# Patient Record
Sex: FEMALE | Race: WHITE | NOT HISPANIC OR LATINO | Employment: FULL TIME | URBAN - METROPOLITAN AREA
[De-identification: names, ages, dates, MRNs, and addresses within clinical notes are randomized per-mention and may not be internally consistent; named-entity substitution may affect disease eponyms.]

---

## 2017-06-02 ENCOUNTER — OFFICE VISIT (OUTPATIENT)
Dept: OBSTETRICS AND GYNECOLOGY | Facility: CLINIC | Age: 37
End: 2017-06-02

## 2017-06-02 VITALS
HEIGHT: 69 IN | DIASTOLIC BLOOD PRESSURE: 80 MMHG | WEIGHT: 200 LBS | BODY MASS INDEX: 29.62 KG/M2 | SYSTOLIC BLOOD PRESSURE: 120 MMHG

## 2017-06-02 DIAGNOSIS — Z11.3 SCREEN FOR STD (SEXUALLY TRANSMITTED DISEASE): ICD-10-CM

## 2017-06-02 DIAGNOSIS — Z12.4 PAP SMEAR FOR CERVICAL CANCER SCREENING: ICD-10-CM

## 2017-06-02 DIAGNOSIS — Z13.9 SCREENING: ICD-10-CM

## 2017-06-02 DIAGNOSIS — Z01.419 WELL FEMALE EXAM WITH ROUTINE GYNECOLOGICAL EXAM: Primary | ICD-10-CM

## 2017-06-02 DIAGNOSIS — N93.9 ABNORMAL UTERINE BLEEDING (AUB): ICD-10-CM

## 2017-06-02 LAB
BILIRUB BLD-MCNC: NEGATIVE MG/DL
CLARITY, POC: CLEAR
COLOR UR: YELLOW
GLUCOSE UR STRIP-MCNC: NEGATIVE MG/DL
KETONES UR QL: NEGATIVE
LEUKOCYTE EST, POC: NEGATIVE
NITRITE UR-MCNC: NEGATIVE MG/ML
PH UR: 6 [PH] (ref 5–8)
PROT UR STRIP-MCNC: NEGATIVE MG/DL
RBC # UR STRIP: ABNORMAL /UL
SP GR UR: 1.01 (ref 1–1.03)
UROBILINOGEN UR QL: NORMAL

## 2017-06-02 PROCEDURE — 99395 PREV VISIT EST AGE 18-39: CPT | Performed by: OBSTETRICS & GYNECOLOGY

## 2017-06-02 PROCEDURE — 81002 URINALYSIS NONAUTO W/O SCOPE: CPT | Performed by: OBSTETRICS & GYNECOLOGY

## 2017-06-02 NOTE — PROGRESS NOTES
[facility]  Routine Annual Visit    2017    Patient: Suman Goodman          MR#:[id number]      History of Present Illness    37 y.o. female  who presents for annual exam.    Patient's last menstrual period was 2017 (lmp unknown).  Obstetric History:  OB History      Para Term  AB TAB SAB Ectopic Multiple Living    4 3 3  1  1   3         Menstrual History:  Menarche age: 12 years  Patient's last menstrual period was 2017 (lmp unknown).  Period Cycle (Days): 30  Period Duration (Days): 5  Period Pattern: Regular  Menstrual Flow: Heavy  Menstrual Control: Maxi pad, Tampon    Sexual History:       ________________________________________  Patient Active Problem List   Diagnosis   • Well female exam with routine gynecological exam   • Abnormal uterine bleeding (AUB)       History reviewed. No pertinent past medical history.    Past Surgical History:   Procedure Laterality Date   • CHOLECYSTECTOMY     • TONSILLECTOMY     • TUBAL ABDOMINAL LIGATION     • WOUND EXPLORATION ARM      left arm abcess       History   Smoking Status   • Former Smoker   • Quit date: 2015   Smokeless Tobacco   • Never Used       Family History   Problem Relation Age of Onset   • Cervical cancer Maternal Grandmother 60       Prior to Admission medications    Not on File     ________________________________________    Current contraception: tubal ligation  History of abnormal Pap smear: no  Family history of uterine or ovarian cancer: no  Family History of colon cancer/colon polyps: no  History of abnormal mammogram: no  History of abnormal lipids: no    The following portions of the patient's history were reviewed and updated as appropriate: allergies, current medications, past family history, past medical history, past social history, past surgical history and problem list.    Review of Systems    Pertinent items are noted in HPI.     Objective   Physical Exam    /80   "Ht 69\" (175.3 cm)  Wt 200 lb (90.7 kg)  LMP 05/23/2017 (LMP Unknown)  Breastfeeding? No  BMI 29.53 kg/m2   BP Readings from Last 3 Encounters:   06/02/17 120/80      Wt Readings from Last 3 Encounters:   06/02/17 200 lb (90.7 kg)        BMI: Estimated body mass index is 29.53 kg/(m^2) as calculated from the following:    Height as of this encounter: 69\" (175.3 cm).    Weight as of this encounter: 200 lb (90.7 kg).    General:   alert, appears stated age and cooperative   Heart: regular rate and rhythm, S1, S2 normal, no murmur, click, rub or gallop   Lungs: clear to auscultation bilaterally   Abdomen: soft, non-tender, without masses or organomegaly   Breast: inspection negative, no nipple discharge or bleeding, no masses or nodularity palpable and mild bilateral FC change    Vulva: normal   Vagina: normal mucosa   Cervix: no lesions   Uterus: normal size   Adnexa: exam limited by habitus     Assessment:    Suman was seen today for gynecologic exam.    Diagnoses and all orders for this visit:    Well female exam with routine gynecological exam    Screening  -     POC Urinalysis Dipstick    Pap smear for cervical cancer screening  -     IgP, Aptima HPV    Abnormal uterine bleeding (AUB)  -     TSH  -     Luteinizing hormone  -     Follicle stimulating hormone  -     Prolactin  -     T4, free          Plan:    []  Rx:   []  Mammogram request made  []  PAP done  []  Occult fecal blood test (Insure)  []  Labs:   []  GC/Chl/TV  []  DEXA scan   []  Referral for colonoscopy:           Chris Macedo MD  6/2/2017 1:58 PM  "

## 2017-06-03 LAB
FSH SERPL-ACNC: 10.8 MIU/ML
LH SERPL-ACNC: 29.7 MIU/ML
PROLACTIN SERPL-MCNC: 15.8 NG/ML (ref 4.8–23.3)
T4 FREE SERPL-MCNC: 1.13 NG/DL (ref 0.82–1.77)
TSH SERPL DL<=0.005 MIU/L-ACNC: 2.47 UIU/ML (ref 0.45–4.5)

## 2017-06-05 ENCOUNTER — TELEPHONE (OUTPATIENT)
Dept: OBSTETRICS AND GYNECOLOGY | Facility: CLINIC | Age: 37
End: 2017-06-05

## 2017-06-05 NOTE — TELEPHONE ENCOUNTER
----- Message from Chris Macedo MD sent at 6/3/2017  7:37 AM EDT -----  Notify pt:  Hormonal lab panel is normal and unremarkable

## 2017-06-06 LAB
C TRACH RRNA SPEC QL NAA+PROBE: NEGATIVE
N GONORRHOEA RRNA SPEC QL NAA+PROBE: NEGATIVE
T VAGINALIS RRNA SPEC QL NAA+PROBE: NEGATIVE

## 2017-06-07 ENCOUNTER — TELEPHONE (OUTPATIENT)
Dept: OBSTETRICS AND GYNECOLOGY | Facility: CLINIC | Age: 37
End: 2017-06-07

## 2017-06-07 LAB
CYTOLOGIST CVX/VAG CYTO: NORMAL
CYTOLOGY CVX/VAG DOC THIN PREP: NORMAL
DX ICD CODE: NORMAL
HIV 1 & 2 AB SER-IMP: NORMAL
HPV I/H RISK 4 DNA CVX QL PROBE+SIG AMP: NEGATIVE
OTHER STN SPEC: NORMAL
PATH REPORT.FINAL DX SPEC: NORMAL
STAT OF ADQ CVX/VAG CYTO-IMP: NORMAL

## 2017-06-07 NOTE — TELEPHONE ENCOUNTER
----- Message from Chris Macedo MD sent at 6/7/2017  8:13 AM EDT -----  Call pt:  PAP is negative.

## 2017-06-14 ENCOUNTER — OFFICE VISIT (OUTPATIENT)
Dept: OBSTETRICS AND GYNECOLOGY | Facility: CLINIC | Age: 37
End: 2017-06-14

## 2017-06-14 ENCOUNTER — PROCEDURE VISIT (OUTPATIENT)
Dept: OBSTETRICS AND GYNECOLOGY | Facility: CLINIC | Age: 37
End: 2017-06-14

## 2017-06-14 VITALS
BODY MASS INDEX: 29.47 KG/M2 | WEIGHT: 199 LBS | DIASTOLIC BLOOD PRESSURE: 70 MMHG | HEIGHT: 69 IN | SYSTOLIC BLOOD PRESSURE: 110 MMHG

## 2017-06-14 DIAGNOSIS — N93.9 ABNORMAL UTERINE BLEEDING (AUB): ICD-10-CM

## 2017-06-14 DIAGNOSIS — N97.0 ANOVULATORY BLEEDING: Primary | ICD-10-CM

## 2017-06-14 DIAGNOSIS — Z13.9 SCREENING: ICD-10-CM

## 2017-06-14 LAB
BILIRUB BLD-MCNC: NEGATIVE MG/DL
CLARITY, POC: CLEAR
COLOR UR: YELLOW
GLUCOSE UR STRIP-MCNC: NEGATIVE MG/DL
KETONES UR QL: NEGATIVE
LEUKOCYTE EST, POC: NEGATIVE
NITRITE UR-MCNC: NEGATIVE MG/ML
PH UR: 6 [PH] (ref 5–8)
PROT UR STRIP-MCNC: NEGATIVE MG/DL
RBC # UR STRIP: ABNORMAL /UL
SP GR UR: 1.02 (ref 1–1.03)
UROBILINOGEN UR QL: NORMAL

## 2017-06-14 PROCEDURE — 76830 TRANSVAGINAL US NON-OB: CPT | Performed by: OBSTETRICS & GYNECOLOGY

## 2017-06-14 PROCEDURE — 99213 OFFICE O/P EST LOW 20 MIN: CPT | Performed by: OBSTETRICS & GYNECOLOGY

## 2017-06-14 PROCEDURE — 81002 URINALYSIS NONAUTO W/O SCOPE: CPT | Performed by: OBSTETRICS & GYNECOLOGY

## 2017-06-14 RX ORDER — MEDROXYPROGESTERONE ACETATE 10 MG/1
10 TABLET ORAL DAILY
Qty: 30 TABLET | Refills: 4 | Status: SHIPPED | OUTPATIENT
Start: 2017-06-14 | End: 2017-06-24

## 2017-06-14 NOTE — PROGRESS NOTES
[facility]  Ultrasound Note     2017    Patient:  Suman Goodman      MR#:[id number]    37 y.o.      Patient Active Problem List   Diagnosis   • Well female exam with routine gynecological exam   • Abnormal uterine bleeding (AUB)   • Anovulatory bleeding       [See the scanned report in the media tab for more details]    Impression    1.  Normal uterus: 7.8 x 4.1 x 4.9  2.  Endometrium prominent at 15 mm   3.  Myometrium unremarkable  4.  Ovaries bilaterally within normal limits  5.  Essentially unremarkable pelvic ultrasound    Relevant comparison data available:  [x]  None      Chris Macedo MD  2017 12:41 PM

## 2017-06-14 NOTE — PROGRESS NOTES
[facility]     2017      Patient:  Suman Goodman   MR#:[id number]    Office note    CC:  Irregular cycles     Subjective     History of Present Illness  37 y.o. female  previously for annual exam reporting abnormal uterine bleeding with periods occurring every 18-20 days lasting for 4 - 6 days.  Anovulatory panel returned within normal limits.  Ultrasound is completed today that's essentially unremarkable with the exception of a slightly prominent endometrium.  I discussed with the patient she most likely has anovulatory bleeding      Patient Active Problem List   Diagnosis   • Well female exam with routine gynecological exam   • Abnormal uterine bleeding (AUB)       History reviewed. No pertinent past medical history.    Past Surgical History:   Procedure Laterality Date   • CHOLECYSTECTOMY     • TONSILLECTOMY     • TUBAL ABDOMINAL LIGATION     • WOUND EXPLORATION ARM  2005    left arm abcess       Obstetric History:  OB History      Para Term  AB TAB SAB Ectopic Multiple Living    4 3 3  1  1   3         Menstrual History:     Patient's last menstrual period was 2017 (lmp unknown).       #: 1, Date: , Sex: Female, Weight: 7 lb 14 oz (3.572 kg), GA: 40w0d, Delivery: Vaginal, Spontaneous Delivery, Apgar1: None, Apgar5: None, Living: Yes, Birth Comments: None    #: 2, Date: , Sex: None, Weight: None, GA: None, Delivery: None, Apgar1: None, Apgar5: None, Living: None, Birth Comments: None    #: 3, Date: 2006, Sex: Male, Weight: 8 lb 11 oz (3.941 kg), GA: 40w0d, Delivery: Vaginal, Spontaneous Delivery, Apgar1: 8, Apgar5: 9, Living: Yes, Birth Comments: None    #: 4, Date: 08, Sex: Male, Weight: 7 lb 9 oz (3.43 kg), GA: 39w0d, Delivery: Vaginal, Spontaneous Delivery, Apgar1: 8, Apgar5: 9, Living: Yes, Birth Comments: None      Family History   Problem Relation Age of Onset   • Cervical cancer Maternal Grandmother 60       Social History  "  Substance Use Topics   • Smoking status: Former Smoker     Quit date: 6/2/2015   • Smokeless tobacco: Never Used   • Alcohol use Yes      Comment: rare       Augmentin [amoxicillin-pot clavulanate]; Keflex [cephalexin]; and Penicillins    No current outpatient prescriptions on file.    The following portions of the patient's history were reviewed and updated as appropriate: allergies, current medications, past family history, past medical history, past social history, past surgical history and problem list.    Review of Systems   Constitutional: Negative.    HENT: Negative.    Eyes: Negative.    Respiratory: Negative.    Cardiovascular: Negative.    Gastrointestinal: Negative.    Endocrine: Negative.    Genitourinary: Positive for menstrual problem and vaginal bleeding.   Musculoskeletal: Negative.    Skin: Negative.    Allergic/Immunologic: Negative.    Neurological: Negative.    Hematological: Negative.    Psychiatric/Behavioral: Negative.        BP Readings from Last 3 Encounters:   06/14/17 110/70   06/02/17 120/80      Wt Readings from Last 3 Encounters:   06/14/17 199 lb (90.3 kg)   06/02/17 200 lb (90.7 kg)      BMI: Estimated body mass index is 29.39 kg/(m^2) as calculated from the following:    Height as of this encounter: 69\" (175.3 cm).    Weight as of this encounter: 199 lb (90.3 kg).  BSA: Estimated body surface area is 2.06 meters squared as calculated from the following:    Height as of this encounter: 69\" (175.3 cm).    Weight as of this encounter: 199 lb (90.3 kg).    Objective   Physical Exam   Constitutional: She appears well-developed and well-nourished.   Cardiovascular: Normal rate.    Pulmonary/Chest: Effort normal.   Abdominal: Soft. She exhibits no distension and no mass. There is no tenderness. No hernia.   Nursing note and vitals reviewed.        Assessment/Plan     1.  Anovulatory bleeding-Rx Provera 10 mg ×10 days start today every month      Chris Macedo MD   6/14/2017 12:12 PM  "

## 2020-09-16 ENCOUNTER — OFFICE VISIT (OUTPATIENT)
Dept: OBSTETRICS AND GYNECOLOGY | Age: 40
End: 2020-09-16

## 2020-09-16 VITALS — BODY MASS INDEX: 30.72 KG/M2 | SYSTOLIC BLOOD PRESSURE: 128 MMHG | WEIGHT: 208 LBS | DIASTOLIC BLOOD PRESSURE: 80 MMHG

## 2020-09-16 DIAGNOSIS — Z01.419 WELL FEMALE EXAM WITH ROUTINE GYNECOLOGICAL EXAM: Primary | ICD-10-CM

## 2020-09-16 DIAGNOSIS — F17.200 SMOKER UNMOTIVATED TO QUIT: ICD-10-CM

## 2020-09-16 DIAGNOSIS — N97.0 ANOVULATORY (DYSFUNCTIONAL UTERINE) BLEEDING: ICD-10-CM

## 2020-09-16 DIAGNOSIS — Z13.89 SCREENING FOR BLOOD OR PROTEIN IN URINE: ICD-10-CM

## 2020-09-16 DIAGNOSIS — Z12.4 SCREENING FOR MALIGNANT NEOPLASM OF THE CERVIX: ICD-10-CM

## 2020-09-16 LAB
BILIRUB BLD-MCNC: NEGATIVE MG/DL
GLUCOSE UR STRIP-MCNC: NEGATIVE MG/DL
KETONES UR QL: NEGATIVE
LEUKOCYTE EST, POC: NEGATIVE
NITRITE UR-MCNC: NEGATIVE MG/ML
PH UR: 6 [PH] (ref 5–8)
PROT UR STRIP-MCNC: NEGATIVE MG/DL
RBC # UR STRIP: ABNORMAL /UL
SP GR UR: 1.02 (ref 1–1.03)
UROBILINOGEN UR QL: NORMAL

## 2020-09-16 PROCEDURE — 81002 URINALYSIS NONAUTO W/O SCOPE: CPT | Performed by: OBSTETRICS & GYNECOLOGY

## 2020-09-16 PROCEDURE — 99213 OFFICE O/P EST LOW 20 MIN: CPT | Performed by: OBSTETRICS & GYNECOLOGY

## 2020-09-16 PROCEDURE — 99386 PREV VISIT NEW AGE 40-64: CPT | Performed by: OBSTETRICS & GYNECOLOGY

## 2020-09-16 RX ORDER — MEDROXYPROGESTERONE ACETATE 10 MG/1
10 TABLET ORAL DAILY
Qty: 30 TABLET | Refills: 4 | Status: SHIPPED | OUTPATIENT
Start: 2020-09-16 | End: 2020-09-26

## 2020-09-16 NOTE — PROGRESS NOTES
Routine Annual Visit    2020    Patient: Suman Goodman          MR#:[id number]      History of Present Illness    Chief Complaint   Patient presents with   • Gynecologic Exam     cc: reestablish care today , annual visit today , last pap 17 neg , [facility]g [facility] , c/o having heavy periods in the past but the past 2 months had no menstrual cycle  .         40 y.o. female  who presents for annual exam.    The patient presents for routine annual exam without major complaints todau.  She does state having periodic irregular menstrual cycles consistent with anovulatory bleeding that she describes as intermittent and moderate for the last year.        Patient's last menstrual period was 07/15/2020 (approximate).  Obstetric History:  OB History        4    Para   3    Term   3            AB   1    Living   3       SAB   1    TAB        Ectopic        Molar        Multiple        Live Births   3               Menstrual History:     Patient's last menstrual period was 07/15/2020 (approximate).       Sexual History:       ________________________________________  Patient Active Problem List   Diagnosis   • Well female exam with routine gynecological exam   • Abnormal uterine bleeding (AUB)   • Anovulatory bleeding   • Smoker unmotivated to quit       History reviewed. No pertinent past medical history.    Past Surgical History:   Procedure Laterality Date   • CHOLECYSTECTOMY     • TONSILLECTOMY     • TUBAL ABDOMINAL LIGATION     • WOUND EXPLORATION ARM  2005    left arm abcess       Social History     Tobacco Use   Smoking Status Current Every Day Smoker   Smokeless Tobacco Never Used       Family History   Problem Relation Age of Onset   • Cervical cancer Maternal Grandmother 60   • No Known Problems Mother    • No Known Problems Father        Prior to Admission medications    Medication Sig Start Date End Date Taking? Authorizing Provider   valACYclovir (VALTREX) 1000  "MG tablet Take 1 tablet by mouth 3 (Three) Times a Day. 6/5/18   Dianelys Bates, KIKA     ________________________________________    Current contraception: tubal ligation  History of abnormal Pap smear: no  Family history of uterine or ovarian cancer: no  Family History of colon cancer/colon polyps: no  History of abnormal mammogram: no  History of abnormal lipids: no    The following portions of the patient's history were reviewed and updated as appropriate: allergies, current medications, past family history, past medical history, past social history, past surgical history and problem list.    Review of Systems    Pertinent items are noted in HPI.     Objective   Physical Exam    /80   Wt 94.3 kg (208 lb)   LMP 07/15/2020 (Approximate)   Breastfeeding No   BMI 30.72 kg/m²    BP Readings from Last 3 Encounters:   09/16/20 128/80   06/05/18 138/86   06/14/17 110/70      Wt Readings from Last 3 Encounters:   09/16/20 94.3 kg (208 lb)   06/05/18 97.1 kg (214 lb)   06/14/17 90.3 kg (199 lb)        BMI: Estimated body mass index is 30.72 kg/m² as calculated from the following:    Height as of 6/5/18: 175.3 cm (69\").    Weight as of this encounter: 94.3 kg (208 lb).       General: alert, appears stated age and cooperative   Heart: regular rate and rhythm, S1, S2 normal, no murmur, click, rub or gallop   Lungs: clear to auscultation bilaterally   Abdomen: soft, non-tender, without masses, no organomegaly   Breast: inspection negative, no nipple discharge or bleeding, no masses or nodularity palpable   Vulva: normal and bartholin's, Urethra, Abeytas's normal   Vagina: normal mucosa, normal discharge   Cervix: multiparous appearance and no lesions   Uterus: normal size, mobile or non-tender   Adnexa: normal adnexa     As part of wellness and prevention, the following topics were discussed with the patient:  Encouraged self breast exam  Physical activity and regular exercised encouraged.         Assessment:    Suman " was seen today for gynecologic exam.    Diagnoses and all orders for this visit:    Well female exam with routine gynecological exam    Screening for malignant neoplasm of the cervix  -     IgP, Aptima HPV    Screening for blood or protein in urine  -     POC Urinalysis Dipstick    Anovulatory (dysfunctional uterine) bleeding  -     medroxyPROGESTERone (Provera) 10 MG tablet; Take 1 tablet by mouth Daily for 10 days.    Smoker unmotivated to quit      Discussed long-term risk of anovulatory bleeding and developing endometrial hyperplasia and endometrial cancer.  Provera is sent to the patient's pharmacy to trigger her menstrual cycle.  Thereafter if her menstrual cycles are relatively regular no intervention is indicated.  If the patient begins to have irregular bleeding and prolonged periods with no menses she needs to call      Plan:  Return in about 1 year (around 9/16/2021) for Annual GYN exam.      Chris Macedo MD  9/16/2020 11:37 EDT

## 2020-09-17 DIAGNOSIS — N60.02 BREAST CYST, LEFT: Primary | ICD-10-CM

## 2020-09-17 PROBLEM — R92.8 ABNORMALITY OF LEFT BREAST ON SCREENING MAMMOGRAM: Status: ACTIVE | Noted: 2020-09-17

## 2020-09-18 LAB
C TRACH RRNA SPEC QL NAA+PROBE: NEGATIVE
N GONORRHOEA RRNA SPEC QL NAA+PROBE: NEGATIVE
T VAGINALIS DNA SPEC QL NAA+PROBE: NEGATIVE

## 2020-09-21 LAB
CYTOLOGIST CVX/VAG CYTO: NORMAL
CYTOLOGY CVX/VAG DOC CYTO: NORMAL
CYTOLOGY CVX/VAG DOC THIN PREP: NORMAL
DX ICD CODE: NORMAL
HIV 1 & 2 AB SER-IMP: NORMAL
HPV I/H RISK 4 DNA CVX QL PROBE+SIG AMP: NEGATIVE
OTHER STN SPEC: NORMAL
STAT OF ADQ CVX/VAG CYTO-IMP: NORMAL

## 2021-03-09 ENCOUNTER — TELEPHONE (OUTPATIENT)
Dept: OBSTETRICS AND GYNECOLOGY | Age: 41
End: 2021-03-09

## 2021-03-09 DIAGNOSIS — R92.8 ABNORMALITY OF LEFT BREAST ON SCREENING MAMMOGRAM: Primary | ICD-10-CM

## 2021-03-09 NOTE — TELEPHONE ENCOUNTER
Patient was due for a 6 month f/u in January for Left Breast U/S. Had patient scheduled 1/7/21 for Left Breast U/S at [facility]. She no showed. I have been calling pt since January and sent a post card. I am unable to reach patient to get her rescheduled at [facility] for this study.  Fany

## 2021-03-09 NOTE — TELEPHONE ENCOUNTER
Patient just returned my call. She states she did go in January [facility] did not send us the report. Per Pt she states radiologist came in and told her she will need a 6 month f/u in June. I will call [facility] for this report and route to Dr. Macedo for orders.  Fany

## 2021-10-06 ENCOUNTER — OFFICE VISIT (OUTPATIENT)
Dept: OBSTETRICS AND GYNECOLOGY | Age: 41
End: 2021-10-06

## 2021-10-06 VITALS
DIASTOLIC BLOOD PRESSURE: 80 MMHG | WEIGHT: 214.8 LBS | SYSTOLIC BLOOD PRESSURE: 124 MMHG | BODY MASS INDEX: 31.81 KG/M2 | HEIGHT: 69 IN

## 2021-10-06 DIAGNOSIS — Z13.0 SCREENING, ANEMIA, DEFICIENCY, IRON: ICD-10-CM

## 2021-10-06 DIAGNOSIS — R92.8 ABNORMALITY OF LEFT BREAST ON SCREENING MAMMOGRAM: ICD-10-CM

## 2021-10-06 DIAGNOSIS — Z12.31 SCREENING MAMMOGRAM, ENCOUNTER FOR: ICD-10-CM

## 2021-10-06 DIAGNOSIS — Z12.4 SCREENING FOR MALIGNANT NEOPLASM OF CERVIX: ICD-10-CM

## 2021-10-06 DIAGNOSIS — F17.200 SMOKER UNMOTIVATED TO QUIT: ICD-10-CM

## 2021-10-06 DIAGNOSIS — N60.02 BENIGN CYST OF LEFT BREAST: ICD-10-CM

## 2021-10-06 DIAGNOSIS — Z13.89 SCREENING FOR BLOOD OR PROTEIN IN URINE: ICD-10-CM

## 2021-10-06 DIAGNOSIS — R63.5 WEIGHT GAIN: ICD-10-CM

## 2021-10-06 DIAGNOSIS — Z01.419 WELL FEMALE EXAM WITH ROUTINE GYNECOLOGICAL EXAM: Primary | ICD-10-CM

## 2021-10-06 DIAGNOSIS — Z13.228 SCREENING FOR METABOLIC DISORDER: ICD-10-CM

## 2021-10-06 DIAGNOSIS — Z11.51 SCREENING FOR HUMAN PAPILLOMAVIRUS (HPV): ICD-10-CM

## 2021-10-06 DIAGNOSIS — N93.9 ABNORMAL UTERINE BLEEDING (AUB): ICD-10-CM

## 2021-10-06 LAB
BILIRUB BLD-MCNC: ABNORMAL MG/DL
GLUCOSE UR STRIP-MCNC: NEGATIVE MG/DL
KETONES UR QL: NEGATIVE
LEUKOCYTE EST, POC: NEGATIVE
NITRITE UR-MCNC: NEGATIVE MG/ML
PH UR: 6 [PH] (ref 5–8)
PROT UR STRIP-MCNC: ABNORMAL MG/DL
RBC # UR STRIP: ABNORMAL /UL
SP GR UR: 1.03 (ref 1–1.03)
UROBILINOGEN UR QL: NORMAL

## 2021-10-06 PROCEDURE — 81002 URINALYSIS NONAUTO W/O SCOPE: CPT | Performed by: OBSTETRICS & GYNECOLOGY

## 2021-10-06 PROCEDURE — 99396 PREV VISIT EST AGE 40-64: CPT | Performed by: OBSTETRICS & GYNECOLOGY

## 2021-10-06 RX ORDER — HYDROXYZINE HYDROCHLORIDE 25 MG/1
25 TABLET, FILM COATED ORAL
COMMUNITY
Start: 2021-05-26 | End: 2021-10-06

## 2021-10-06 RX ORDER — ROSUVASTATIN CALCIUM 10 MG/1
10 TABLET, COATED ORAL DAILY
COMMUNITY
Start: 2021-05-27 | End: 2021-11-23

## 2021-10-06 NOTE — PROGRESS NOTES
[facility]   Obstetrics and Gynecology   Routine Annual Visit    10/6/2021    Patient: Suman Goodman          MR#:[id number]    History of Present Illness    Chief Complaint   Patient presents with   • Gynecologic Exam     pap  (neg) Lt breast US - follow up in 6 months        41 y.o. female  who presents for annual exam.    The patient presents for her routine exam with complaint of continued intermittent irregularities in her cycle.  Most concerning of the patient's complaints is more recent weight gain that seems unexplained.  The patient feels fairly confident that she is menopausal despite still having regular menstrual bleeding.  The patient requests testing accordingly.    The patient also has a history of benign breast cysts with scheduled follow-up for routine surveillance      Studies reviewed:  US Breast Left Complete (2021)      Patient's last menstrual period was 2021 (exact date).  Obstetric History:  OB History        4    Para   3    Term   3            AB   1    Living   3       SAB   1    TAB        Ectopic        Molar        Multiple        Live Births   3               Menstrual History:     Patient's last menstrual period was 2021 (exact date).       Sexual History:       ________________________________________  Patient Active Problem List   Diagnosis   • Well female exam with routine gynecological exam   • Abnormal uterine bleeding (AUB)   • Anovulatory bleeding   • Smoker unmotivated to quit   • Benign cyst of left breast   • Abnormality of left breast on screening mammogram   • Weight gain     History reviewed. No pertinent past medical history.  Past Surgical History:   Procedure Laterality Date   • CHOLECYSTECTOMY     • TONSILLECTOMY     • TUBAL ABDOMINAL LIGATION     • WOUND EXPLORATION ARM  2005    left arm abcess     Social History     Tobacco Use   Smoking Status Current Every Day Smoker   Smokeless Tobacco  "Never Used     Family History   Problem Relation Age of Onset   • Cervical cancer Maternal Grandmother 60   • No Known Problems Mother    • No Known Problems Father      Prior to Admission medications    Medication Sig Start Date End Date Taking? Authorizing Provider   rosuvastatin (CRESTOR) 10 MG tablet Take 10 mg by mouth Daily. 5/27/21 11/23/21 Yes Smitha Landaverde MD   hydrOXYzine (ATARAX) 25 MG tablet Take 25 mg by mouth. 5/26/21   Smitha Landaverde MD   valACYclovir (VALTREX) 1000 MG tablet Take 1 tablet by mouth 3 (Three) Times a Day. 6/5/18   Dianelys Bates APRN   varenicline (Chantix Starting Month Pak) 0.5 MG X 11 & 1 MG X 42 tablet FOLLOW PACKAGE DIRECTIONS 9/14/21   Smitha Landaverde MD     ________________________________________    Current contraception: tubal ligation  History of abnormal Pap smear: no  Family history of uterine or ovarian cancer: no  Family History of colon cancer/colon polyps: no  History of abnormal mammogram: yes - benign breast cyst  History of abnormal lipids: yes - treated    The following portions of the patient's history were reviewed and updated as appropriate: allergies, current medications, past family history, past medical history, past social history, past surgical history and problem list.    Review of Systems    Pertinent items are noted in HPI.       Objective   Physical Exam    /80   Ht 175.3 cm (69\")   Wt 97.4 kg (214 lb 12.8 oz)   LMP 09/27/2021 (Exact Date)   Breastfeeding No   BMI 31.72 kg/m²    BP Readings from Last 3 Encounters:   10/06/21 124/80   09/16/20 128/80   06/05/18 138/86      Wt Readings from Last 3 Encounters:   10/06/21 97.4 kg (214 lb 12.8 oz)   09/16/20 94.3 kg (208 lb)   06/05/18 97.1 kg (214 lb)        BMI: Estimated body mass index is 31.72 kg/m² as calculated from the following:    Height as of this encounter: 175.3 cm (69\").    Weight as of this encounter: 97.4 kg (214 lb 12.8 oz).       General: alert, appears " stated age and cooperative   Heart: regular rate and rhythm, S1, S2 normal, no murmur, click, rub or gallop   Lungs: clear to auscultation bilaterally   Abdomen: soft, non-tender, without masses, no organomegaly   Breast: inspection negative, no nipple discharge or bleeding, no masses or nodularity palpable   External genitalia/Vulva: External genitalia including bartholin's glands, Urethra, Newbern's gland and urethra meatus are normal, Perineum, rectum and anus appear normal  and Bladder appears normal without significant prolapse    Vagina: normal mucosa, normal discharge   Cervix: no lesions   Uterus: normal size, mobile, non-tender and exam is limited habitus    Adnexa: normal adnexa     As part of wellness and prevention, the following topics were discussed with the patient:  Encouraged self breast exam  Physical activity and regular exercised encouraged.         Assessment:    Diagnoses and all orders for this visit:    1. Well female exam with routine gynecological exam (Primary)  -     IgP, Aptima HPV    2. Screening for blood or protein in urine  -     POC Urinalysis Dipstick    3. Abnormal uterine bleeding (AUB)    4. Abnormality of left breast on screening mammogram    5. Benign cyst of left breast    6. Smoker unmotivated to quit    7. Weight gain  -     Follicle Stimulating Hormone  -     Hemoglobin A1c  -     TSH  -     T4, free    8. Screening for metabolic disorder  -     Comprehensive Metabolic Panel    9. Screening, anemia, deficiency, iron  -     CBC & Differential    10. Screening for human papillomavirus (HPV)  -     IgP, Aptima HPV    11. Screening for malignant neoplasm of cervix  -     IgP, Aptima HPV    12. Screening mammogram, encounter for  -     Mammo Screening Digital Tomosynthesis Bilateral With CAD; Future        Plan:  Return in about 1 year (around 10/6/2022) for Annual GYN exam.      Chris Macedo MD  10/6/2021 10:06 EDT

## 2021-10-07 PROBLEM — N95.1 MENOPAUSAL STATE: Status: ACTIVE | Noted: 2021-10-07

## 2021-10-07 LAB
ALBUMIN SERPL-MCNC: 4.4 G/DL (ref 3.5–5.2)
ALBUMIN/GLOB SERPL: 2.3 G/DL
ALP SERPL-CCNC: 68 U/L (ref 39–117)
ALT SERPL-CCNC: 11 U/L (ref 1–33)
AST SERPL-CCNC: 12 U/L (ref 1–32)
BASOPHILS # BLD AUTO: 0.1 10*3/MM3 (ref 0–0.2)
BASOPHILS NFR BLD AUTO: 1.4 % (ref 0–1.5)
BILIRUB SERPL-MCNC: 0.2 MG/DL (ref 0–1.2)
BUN SERPL-MCNC: 9 MG/DL (ref 6–20)
BUN/CREAT SERPL: 10.3 (ref 7–25)
CALCIUM SERPL-MCNC: 9.7 MG/DL (ref 8.6–10.5)
CHLORIDE SERPL-SCNC: 105 MMOL/L (ref 98–107)
CO2 SERPL-SCNC: 24.3 MMOL/L (ref 22–29)
CREAT SERPL-MCNC: 0.87 MG/DL (ref 0.57–1)
EOSINOPHIL # BLD AUTO: 0.2 10*3/MM3 (ref 0–0.4)
EOSINOPHIL NFR BLD AUTO: 2.8 % (ref 0.3–6.2)
ERYTHROCYTE [DISTWIDTH] IN BLOOD BY AUTOMATED COUNT: 12.8 % (ref 12.3–15.4)
FSH SERPL-ACNC: 52.1 MIU/ML
GLOBULIN SER CALC-MCNC: 1.9 GM/DL
GLUCOSE SERPL-MCNC: 84 MG/DL (ref 65–99)
HBA1C MFR BLD: 5.2 % (ref 4.8–5.6)
HCT VFR BLD AUTO: 43.4 % (ref 34–46.6)
HGB BLD-MCNC: 14.2 G/DL (ref 12–15.9)
IMM GRANULOCYTES # BLD AUTO: 0.02 10*3/MM3 (ref 0–0.05)
IMM GRANULOCYTES NFR BLD AUTO: 0.3 % (ref 0–0.5)
LYMPHOCYTES # BLD AUTO: 1.95 10*3/MM3 (ref 0.7–3.1)
LYMPHOCYTES NFR BLD AUTO: 27.2 % (ref 19.6–45.3)
MCH RBC QN AUTO: 28.3 PG (ref 26.6–33)
MCHC RBC AUTO-ENTMCNC: 32.7 G/DL (ref 31.5–35.7)
MCV RBC AUTO: 86.6 FL (ref 79–97)
MONOCYTES # BLD AUTO: 0.39 10*3/MM3 (ref 0.1–0.9)
MONOCYTES NFR BLD AUTO: 5.4 % (ref 5–12)
NEUTROPHILS # BLD AUTO: 4.52 10*3/MM3 (ref 1.7–7)
NEUTROPHILS NFR BLD AUTO: 62.9 % (ref 42.7–76)
NRBC BLD AUTO-RTO: 0 /100 WBC (ref 0–0.2)
PLATELET # BLD AUTO: 255 10*3/MM3 (ref 140–450)
POTASSIUM SERPL-SCNC: 4.3 MMOL/L (ref 3.5–5.2)
PROT SERPL-MCNC: 6.3 G/DL (ref 6–8.5)
RBC # BLD AUTO: 5.01 10*6/MM3 (ref 3.77–5.28)
SODIUM SERPL-SCNC: 140 MMOL/L (ref 136–145)
T4 FREE SERPL-MCNC: 1.06 NG/DL (ref 0.93–1.7)
TSH SERPL DL<=0.005 MIU/L-ACNC: 3.63 UIU/ML (ref 0.27–4.2)
WBC # BLD AUTO: 7.18 10*3/MM3 (ref 3.4–10.8)

## 2021-10-11 LAB
CYTOLOGIST CVX/VAG CYTO: NORMAL
CYTOLOGY CVX/VAG DOC CYTO: NORMAL
CYTOLOGY CVX/VAG DOC THIN PREP: NORMAL
DX ICD CODE: NORMAL
HIV 1 & 2 AB SER-IMP: NORMAL
HPV I/H RISK 4 DNA CVX QL PROBE+SIG AMP: NEGATIVE
Lab: NORMAL
OTHER STN SPEC: NORMAL
STAT OF ADQ CVX/VAG CYTO-IMP: NORMAL

## 2021-10-12 DIAGNOSIS — R92.8 ABNORMALITY OF LEFT BREAST ON SCREENING MAMMOGRAM: Primary | ICD-10-CM

## 2021-11-03 ENCOUNTER — OFFICE VISIT (OUTPATIENT)
Dept: OBSTETRICS AND GYNECOLOGY | Age: 41
End: 2021-11-03

## 2021-11-03 VITALS
DIASTOLIC BLOOD PRESSURE: 78 MMHG | HEIGHT: 69 IN | BODY MASS INDEX: 31.7 KG/M2 | WEIGHT: 214 LBS | SYSTOLIC BLOOD PRESSURE: 120 MMHG

## 2021-11-03 DIAGNOSIS — N95.1 MENOPAUSAL SYMPTOMS: Primary | ICD-10-CM

## 2021-11-03 DIAGNOSIS — Z13.89 SCREENING FOR BLOOD OR PROTEIN IN URINE: ICD-10-CM

## 2021-11-03 LAB
BILIRUB BLD-MCNC: NEGATIVE MG/DL
CLARITY, POC: CLEAR
COLOR UR: YELLOW
GLUCOSE UR STRIP-MCNC: NEGATIVE MG/DL
KETONES UR QL: ABNORMAL
LEUKOCYTE EST, POC: NEGATIVE
NITRITE UR-MCNC: NEGATIVE MG/ML
PH UR: 6 [PH] (ref 5–8)
PROT UR STRIP-MCNC: NEGATIVE MG/DL
RBC # UR STRIP: ABNORMAL /UL
SP GR UR: 1.03 (ref 1–1.03)
UROBILINOGEN UR QL: NORMAL

## 2021-11-03 PROCEDURE — 81002 URINALYSIS NONAUTO W/O SCOPE: CPT | Performed by: OBSTETRICS & GYNECOLOGY

## 2021-11-03 PROCEDURE — 99213 OFFICE O/P EST LOW 20 MIN: CPT | Performed by: OBSTETRICS & GYNECOLOGY

## 2021-11-03 RX ORDER — NORETHINDRONE ACETATE AND ETHINYL ESTRADIOL 1; 5 MG/1; UG/1
1 TABLET ORAL NIGHTLY
Qty: 30 TABLET | Refills: 12 | Status: SHIPPED | OUTPATIENT
Start: 2021-11-03

## 2021-11-03 NOTE — PROGRESS NOTES
"[facility]   Obstetrics and Gynecology     11/3/2021      Patient:  Suman Goodman   MR#:[id number]    Office note    Chief Complaint   Patient presents with   • Consult     HRT consult; pt c/o hot flashes, insomnia, being hungry, weight fluctuation        Subjective     History of Present Illness  41 y.o. female  presents for follow-up on previous testing for menopausal symptoms that shows met menopause.  The patient is having significant sleep disturbances and reports that she \"just cannot think straight.\"    The patient's had labile mood with associated exceptional hunger and weight gain.        Relevant data reviewed:  IgP, Aptima HPV (10/06/2021 10:34)  T4, free (10/06/2021 10:09)  TSH (10/06/2021 10:09)  Hemoglobin A1c (10/06/2021 10:09)  Follicle Stimulating Hormone (10/06/2021 10:09)  CBC & Differential (10/06/2021 10:09)  Comprehensive Metabolic Panel (10/06/2021 10:09)      Patient Active Problem List   Diagnosis   • Well female exam with routine gynecological exam   • Abnormal uterine bleeding (AUB)   • Anovulatory bleeding   • Smoker unmotivated to quit   • Benign cyst of left breast   • Abnormality of left breast on screening mammogram   • Weight gain   • Menopausal state       No past medical history on file.  Past Surgical History:   Procedure Laterality Date   • CHOLECYSTECTOMY     • TONSILLECTOMY     • TUBAL ABDOMINAL LIGATION     • WOUND EXPLORATION ARM  2005    left arm abcess     Obstetric History:  OB History        4    Para   3    Term   3            AB   1    Living   3       SAB   1    IAB        Ectopic        Molar        Multiple        Live Births   3               Menstrual History:     Patient's last menstrual period was 10/24/2021 (exact date).       # 1 - Date: , Sex: Female, Weight: 3572 g (7 lb 14 oz), GA: 40w0d, Delivery: Vaginal, Spontaneous, Apgar1: None, Apgar5: None, Living: Living, Birth Comments: None    # 2 - Date: , " Sex: None, Weight: None, GA: None, Delivery: None, Apgar1: None, Apgar5: None, Living: None, Birth Comments: None    # 3 - Date: 11/2006, Sex: Male, Weight: 3941 g (8 lb 11 oz), GA: 40w0d, Delivery: Vaginal, Spontaneous, Apgar1: 8, Apgar5: 9, Living: Living, Birth Comments: None    # 4 - Date: 02/29/08, Sex: Male, Weight: 3430 g (7 lb 9 oz), GA: 39w0d, Delivery: Vaginal, Spontaneous, Apgar1: 8, Apgar5: 9, Living: Living, Birth Comments: None    Family History   Problem Relation Age of Onset   • Cervical cancer Maternal Grandmother 60   • No Known Problems Mother    • No Known Problems Father      Social History     Tobacco Use   • Smoking status: Current Every Day Smoker   • Smokeless tobacco: Never Used   Substance Use Topics   • Alcohol use: Yes     Comment: rare   • Drug use: No     Augmentin [amoxicillin-pot clavulanate], Keflex [cephalexin], and Penicillins    Current Outpatient Medications:   •  rosuvastatin (CRESTOR) 10 MG tablet, Take 10 mg by mouth Daily., Disp: , Rfl:   •  norethindrone-ethinyl estradiol (FEMHRT 1/5) 1-5 MG-MCG tablet, Take 1 tablet by mouth Every Night. One tablet daily PO, Disp: 30 tablet, Rfl: 12    The following portions of the patient's history were reviewed and updated as appropriate: allergies, current medications, past family history, past medical history, past social history, past surgical history and problem list.    Review of Systems   Constitutional: Positive for fatigue and unexpected weight change.   Respiratory: Negative.    Cardiovascular: Negative.    Gastrointestinal: Negative.    Endocrine:        Hot flashes   Genitourinary: Negative.    Psychiatric/Behavioral: Positive for confusion, decreased concentration and sleep disturbance. The patient is nervous/anxious.        BP Readings from Last 3 Encounters:   11/03/21 120/78   10/06/21 124/80   09/16/20 128/80      Wt Readings from Last 3 Encounters:   11/03/21 97.1 kg (214 lb)   10/06/21 97.4 kg (214 lb 12.8 oz)  "  09/16/20 94.3 kg (208 lb)      BMI: Estimated body mass index is 31.6 kg/m² as calculated from the following:    Height as of this encounter: 175.3 cm (69\").    Weight as of this encounter: 97.1 kg (214 lb). BSA: Estimated body surface area is 2.13 meters squared as calculated from the following:    Height as of this encounter: 175.3 cm (69\").    Weight as of this encounter: 97.1 kg (214 lb).    Objective   Physical Exam  Vitals and nursing note reviewed.   Constitutional:       General: She is not in acute distress.     Appearance: Normal appearance. She is well-developed. She is not ill-appearing.   HENT:      Head: Normocephalic and atraumatic.   Cardiovascular:      Rate and Rhythm: Normal rate.   Pulmonary:      Effort: Pulmonary effort is normal.   Abdominal:      General: Abdomen is flat. Bowel sounds are normal. There is no distension.      Palpations: Abdomen is soft. There is no mass.      Tenderness: There is no abdominal tenderness.   Genitourinary:     Vagina: Normal.   Musculoskeletal:         General: No swelling or tenderness.   Skin:     General: Skin is warm and dry.   Neurological:      Mental Status: She is alert and oriented to person, place, and time.   Psychiatric:         Behavior: Behavior normal.         Thought Content: Thought content normal.         Judgment: Judgment normal.         Assessment/Plan     Diagnoses and all orders for this visit:    1. Menopausal symptoms (Primary)  -     norethindrone-ethinyl estradiol (FEMHRT 1/5) 1-5 MG-MCG tablet; Take 1 tablet by mouth Every Night. One tablet daily PO  Dispense: 30 tablet; Refill: 12    2. Screening for blood or protein in urine  -     POC Urinalysis Dipstick          No follow-ups on file.    Chris Macedo MD   11/3/2021 15:05 EDT  "

## 2022-10-04 ENCOUNTER — TELEPHONE (OUTPATIENT)
Dept: OBSTETRICS AND GYNECOLOGY | Age: 42
End: 2022-10-04

## 2022-10-04 DIAGNOSIS — R92.8 ABNORMAL MAMMOGRAM OF BOTH BREASTS: Primary | ICD-10-CM

## 2022-10-04 NOTE — TELEPHONE ENCOUNTER
I was following up on mammograms and saw where pt did not have her additional imaging done. I spoke to pt and she asked be to go ahead and reschedule. She is past due for LEFT Breast U/S and she is now due for her screening mammogram. The old order for the breast us is still good, however, I need a new order for a Diagnostic Bilateral Mammogram since she is having both images done at the same time. Can you please place the order?

## 2025-07-16 ENCOUNTER — OFFICE VISIT (OUTPATIENT)
Dept: OBSTETRICS AND GYNECOLOGY | Age: 45
End: 2025-07-16
Payer: COMMERCIAL

## 2025-07-16 VITALS
HEIGHT: 69 IN | DIASTOLIC BLOOD PRESSURE: 76 MMHG | BODY MASS INDEX: 30.54 KG/M2 | WEIGHT: 206.2 LBS | SYSTOLIC BLOOD PRESSURE: 112 MMHG

## 2025-07-16 DIAGNOSIS — Z12.31 SCREENING MAMMOGRAM FOR BREAST CANCER: ICD-10-CM

## 2025-07-16 DIAGNOSIS — Z01.419 WELL FEMALE EXAM WITH ROUTINE GYNECOLOGICAL EXAM: Primary | ICD-10-CM

## 2025-07-16 DIAGNOSIS — Z12.4 SCREENING FOR MALIGNANT NEOPLASM OF CERVIX: ICD-10-CM

## 2025-07-16 DIAGNOSIS — Z13.89 SCREENING FOR BLOOD OR PROTEIN IN URINE: ICD-10-CM

## 2025-07-16 DIAGNOSIS — Z11.51 SCREENING FOR HUMAN PAPILLOMAVIRUS (HPV): ICD-10-CM

## 2025-07-16 PROBLEM — R63.5 WEIGHT GAIN: Status: RESOLVED | Noted: 2021-10-06 | Resolved: 2025-07-16

## 2025-07-16 PROBLEM — N60.02 BENIGN CYST OF LEFT BREAST: Status: RESOLVED | Noted: 2020-09-17 | Resolved: 2025-07-16

## 2025-07-16 PROBLEM — N97.0 ANOVULATORY BLEEDING: Status: RESOLVED | Noted: 2017-06-14 | Resolved: 2025-07-16

## 2025-07-16 LAB
BILIRUB BLD-MCNC: NEGATIVE MG/DL
CLARITY, POC: CLEAR
COLOR UR: YELLOW
GLUCOSE UR STRIP-MCNC: NEGATIVE MG/DL
KETONES UR QL: ABNORMAL
LEUKOCYTE EST, POC: ABNORMAL
NITRITE UR-MCNC: NEGATIVE MG/ML
PH UR: 5.5 [PH] (ref 5–8)
PROT UR STRIP-MCNC: ABNORMAL MG/DL
RBC # UR STRIP: ABNORMAL /UL
SP GR UR: 1.03 (ref 1–1.03)
UROBILINOGEN UR QL: ABNORMAL

## 2025-07-16 RX ORDER — DIPHENOXYLATE HYDROCHLORIDE AND ATROPINE SULFATE 2.5; .025 MG/1; MG/1
TABLET ORAL
COMMUNITY
Start: 2025-05-15 | End: 2025-07-16

## 2025-07-16 RX ORDER — MULTIPLE VITAMINS W/ MINERALS TAB 9MG-400MCG
1 TAB ORAL DAILY
COMMUNITY

## 2025-07-16 RX ORDER — PAROXETINE 20 MG/1
20 TABLET, FILM COATED ORAL
COMMUNITY
Start: 2025-05-15 | End: 2025-07-16

## 2025-07-16 RX ORDER — HYDROXYZINE HYDROCHLORIDE 25 MG/1
TABLET, FILM COATED ORAL
COMMUNITY

## 2025-07-16 RX ORDER — TIRZEPATIDE 2.5 MG/.5ML
0.5 INJECTION, SOLUTION SUBCUTANEOUS WEEKLY
COMMUNITY
Start: 2025-06-01

## 2025-07-16 NOTE — PROGRESS NOTES
[facility]   Obstetrics and Gynecology     2025    Patient: Suman Goodman          MR#:[id number]    History of Present Illness    Chief Complaint   Patient presents with    Gynecologic Exam     CC: Old New Annual, last  pap 10/6/21 neg, HPV neg, last mammo 10/4/22 neg       45 y.o. female  who presents for annual exam.    Patient presents for her routine annual exam feeling well without complaints with no problems related to relatively early menopause.  The patient took low-dose hormone replacement therapy for some time interval and then did not like the way it made her feel and stopped the medication.            Relevant data reviewed:    No LMP recorded. (Menstrual status: Tubal ligation).  Obstetric History:  OB History          4    Para   3    Term   3            AB   1    Living   3         SAB   1    IAB        Ectopic        Molar        Multiple        Live Births   3               Menstrual History:     No LMP recorded. (Menstrual status: Tubal ligation).       Social History     Substance and Sexual Activity   Sexual Activity Yes    Partners: Male    Birth control/protection: Tubal ligation     ______________________________________  Patient Active Problem List   Diagnosis    Abnormal uterine bleeding (AUB)    Smoker unmotivated to quit    Abnormality of left breast on screening mammogram    Menopausal state     History reviewed. No pertinent past medical history.  Past Surgical History:   Procedure Laterality Date    CHOLECYSTECTOMY      TONSILLECTOMY      TUBAL ABDOMINAL LIGATION  2008    WOUND EXPLORATION ARM  2005    left arm abcess     Social History     Tobacco Use   Smoking Status Every Day   Smokeless Tobacco Never     Family History   Problem Relation Age of Onset    Cervical cancer Maternal Grandmother 60    No Known Problems Mother     No Known Problems Father      Prior to Admission medications    Medication Sig Start Date End Date Taking?  "Authorizing Provider   hydrOXYzine (ATARAX) 25 MG tablet TAKE 1 TABLET BY MOUTH EVERY 6 (SIX) HOURS IF NEEDED FOR ANXIETY (SLEEP).   Yes Smitha Landaverde MD   multivitamin with minerals tablet tablet Take 1 tablet by mouth Daily.   Yes Smitha Landaverde MD   Zepbound 2.5 MG/0.5ML solution auto-injector Inject 0.5 mL under the skin into the appropriate area as directed 1 (One) Time Per Week. 6/1/25  Yes Smitha Landavedre MD   diphenoxylate-atropine (LOMOTIL) 2.5-0.025 MG per tablet TAKE 1 TABLET BY MOUTH 4 (FOUR) TIMES A DAY IF NEEDED FOR DIARRHEA FOR UP TO 60 DAYS.  Patient not taking: Reported on 7/16/2025 5/15/25   Smitha Landaverde MD   norethindrone-ethinyl estradiol (FEMHRT 1/5) 1-5 MG-MCG tablet Take 1 tablet by mouth Every Night. One tablet daily PO  Patient not taking: Reported on 7/16/2025 11/3/21   Chris Macedo MD   PARoxetine (PAXIL) 20 MG tablet Take 1 tablet by mouth.  Patient not taking: Reported on 7/16/2025 5/15/25 11/11/25  Smitha Landaverde MD   rosuvastatin (CRESTOR) 10 MG tablet Take 10 mg by mouth Daily. 5/27/21 11/23/21  Smitha Landaverde MD     _______________________________________    Current contraception: post menopausal status and tubal ligation  History of abnormal Pap smear: no  Family history of uterine or ovarian cancer: no  Family History of colon cancer/colon polyps: no  History of abnormal mammogram: no  History of abnormal lipids: yes - on statin    The following portions of the patient's history were reviewed and updated as appropriate: allergies, current medications, past family history, past medical history, past social history, past surgical history, and problem list.    Review of Systems    Pertinent items are noted in HPI.       Objective   Physical Exam    /76   Ht 175.3 cm (69\")   Wt 93.5 kg (206 lb 3.2 oz)   BMI 30.45 kg/m²    BP Readings from Last 3 Encounters:   07/16/25 112/76   11/03/21 120/78   10/06/21 124/80      Wt Readings " "from Last 3 Encounters:   25 93.5 kg (206 lb 3.2 oz)   21 97.1 kg (214 lb)   10/06/21 97.4 kg (214 lb 12.8 oz)        BMI: Estimated body mass index is 30.45 kg/m² as calculated from the following:    Height as of this encounter: 175.3 cm (69\").    Weight as of this encounter: 93.5 kg (206 lb 3.2 oz).       General: alert, appears stated age, and cooperative   Heart: regular rate and rhythm, S1, S2 normal, no murmur, click, rub or gallop   Lungs: clear to auscultation bilaterally   Abdomen: soft, non-tender, without masses, no organomegaly   Breast: inspection negative, no nipple discharge or bleeding, no masses or nodularity palpable   External genitalia/Vulva: External genitalia including bartholin's glands, Urethra, [place]'s gland and urethra meatus are normal, Perineum, rectum and anus appear normal , and Bladder appears normal without significant prolapse    Vagina: normal mucosa, normal discharge   Cervix: no lesions   Uterus: normal size and non-tender   Adnexa: normal adnexa     As part of wellness and prevention, the following topics were discussed with the patient:  Encouraged self breast exam  Physical activity and regular exercised encouraged.       Problem List   Meds  History  Prep for Surg   Imagin}    Assessment:     Well female exam with routine gynecological exam    Orders:    IGP, Apt HPV,rfx 16 / 18,45    Screening for human papillomavirus (HPV)    Orders:    IGP, Apt HPV,rfx 16 / 18,45    Screening for malignant neoplasm of cervix    Orders:    IGP, Apt HPV,rfx 16 / 18,45    Screening mammogram for breast cancer    Orders:    Mammo Screening Digital Tomosynthesis Bilateral With CAD; Future    Screening for blood or protein in urine    Orders:    POC Urinalysis Dipstick    Plan:  Return in 1 year (on 2026) for Annual exam.    Chris Macedo MD  2025 11:50 EDT  "

## 2025-07-17 ENCOUNTER — PATIENT ROUNDING (BHMG ONLY) (OUTPATIENT)
Dept: OBSTETRICS AND GYNECOLOGY | Age: 45
End: 2025-07-17
Payer: COMMERCIAL

## 2025-07-17 NOTE — PROGRESS NOTES
A MY CHART MESSAGE HAS BEEN SENT TO THE PATIENT FOR [facility] ROUNDING.

## 2025-07-21 LAB
CYTOLOGIST CVX/VAG CYTO: NORMAL
CYTOLOGY CVX/VAG DOC CYTO: NORMAL
CYTOLOGY CVX/VAG DOC THIN PREP: NORMAL
DX ICD CODE: NORMAL
HPV I/H RISK 4 DNA CVX QL PROBE+SIG AMP: NEGATIVE
Lab: NORMAL
OTHER STN SPEC: NORMAL
SERVICE CMNT-IMP: NORMAL
STAT OF ADQ CVX/VAG CYTO-IMP: NORMAL

## 2025-07-22 ENCOUNTER — RESULTS FOLLOW-UP (OUTPATIENT)
Dept: OBSTETRICS AND GYNECOLOGY | Age: 45
End: 2025-07-22
Payer: COMMERCIAL

## 2025-07-31 DIAGNOSIS — Z12.31 SCREENING MAMMOGRAM FOR BREAST CANCER: ICD-10-CM
